# Patient Record
Sex: MALE | Race: WHITE | HISPANIC OR LATINO | URBAN - METROPOLITAN AREA
[De-identification: names, ages, dates, MRNs, and addresses within clinical notes are randomized per-mention and may not be internally consistent; named-entity substitution may affect disease eponyms.]

---

## 2024-05-21 ENCOUNTER — APPOINTMENT (OUTPATIENT)
Dept: GENERAL RADIOLOGY | Facility: HOSPITAL | Age: 24
End: 2024-05-21

## 2024-05-21 ENCOUNTER — HOSPITAL ENCOUNTER (EMERGENCY)
Facility: HOSPITAL | Age: 24
Discharge: HOME | End: 2024-05-21
Admitting: EMERGENCY MEDICINE

## 2024-05-21 VITALS
DIASTOLIC BLOOD PRESSURE: 79 MMHG | HEART RATE: 75 BPM | BODY MASS INDEX: 20.96 KG/M2 | HEIGHT: 69 IN | RESPIRATION RATE: 18 BRPM | WEIGHT: 141.54 LBS | TEMPERATURE: 98.7 F | SYSTOLIC BLOOD PRESSURE: 122 MMHG | OXYGEN SATURATION: 97 %

## 2024-05-21 DIAGNOSIS — R20.0 NUMBNESS AND TINGLING IN RIGHT HAND: ICD-10-CM

## 2024-05-21 DIAGNOSIS — S52.301A CLOSED FRACTURE OF SHAFT OF RIGHT RADIUS, UNSPECIFIED FRACTURE MORPHOLOGY, INITIAL ENCOUNTER: Primary | ICD-10-CM
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
## 2024-05-21 DIAGNOSIS — S53.401A SPRAIN OF RIGHT ELBOW, INITIAL ENCOUNTER: ICD-10-CM

## 2024-05-21 DIAGNOSIS — R20.2 NUMBNESS AND TINGLING IN RIGHT HAND: ICD-10-CM

## 2024-05-21 PROCEDURE — 73090 X-RAY EXAM OF FOREARM: CPT

## 2024-05-21 PROCEDURE — 99283 EMERGENCY DEPT VISIT LOW MDM: CPT

## 2024-05-21 PROCEDURE — 73070 X-RAY EXAM OF ELBOW: CPT

## 2024-05-21 RX ORDER — HYDROCODONE BITARTRATE AND ACETAMINOPHEN 5; 325 MG/1; MG/1
1 TABLET ORAL EVERY 6 HOURS PRN
Qty: 10 TABLET | Refills: 0 | Status: SHIPPED | OUTPATIENT
Start: 2024-05-21

## 2024-05-21 RX ORDER — HYDROCODONE BITARTRATE AND ACETAMINOPHEN 5; 325 MG/1; MG/1
1 TABLET ORAL ONCE
Status: COMPLETED | OUTPATIENT
Start: 2024-05-21 | End: 2024-05-21

## 2024-05-21 RX ADMIN — HYDROCODONE BITARTRATE AND ACETAMINOPHEN 1 TABLET: 5; 325 TABLET ORAL at 10:36

## 2024-05-21 NOTE — ED PROVIDER NOTES
Subjective   History of Present Illness  24-year-old male presents the ED with complaints of right arm pain after falling on concrete yesterday.  Patient denies LOC or hitting his head.  Patient has friend at bedside who is able to interpret for him and patient consented for friend to interpret. [id number]  [id number] was used for initial assessment and evaluation.  Patient's friend splinted patient's arm with Ortho-Glass and then wrapped with Ace wrap.  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  Patient complains of numbness without weakness in right forearm and hand.  Also complaining of swelling of the hand and discoloration.  Patient denies any daily medications        Review of Systems   Musculoskeletal:  Positive for arthralgias.       No past medical history on file.    No Known Allergies    No past surgical history on file.    No family history on file.    Social History     Socioeconomic History    Marital status: Unknown           Objective   Physical Exam  Vitals and nursing note reviewed.   Constitutional:       General: He is not in acute distress.     Appearance: Normal appearance. He is not ill-appearing.   HENT:      Head: Normocephalic and atraumatic.   Eyes:      Extraocular Movements: Extraocular movements intact.   Cardiovascular:      Rate and Rhythm: Normal rate and regular rhythm.      Pulses: Normal pulses.      Heart sounds: Normal heart sounds.   Pulmonary:      Effort: Pulmonary effort is normal.      Breath sounds: Normal breath sounds.   Abdominal:      General: Bowel sounds are normal.      Palpations: Abdomen is soft.   Musculoskeletal:         General: Swelling and tenderness present.      Cervical back: Normal range of motion.      Comments: The Ortho-Glass and Ace wrap was removed from patient's arm.  There is bruising to the lateral right forearm but no obvious deformity or injury.  Patient complains of  numbness to the right arm but is able to move fingers appropriately.  Patient does have pain with extension and flexion.  All compartments are soft.   Skin:     Findings: Bruising present.      Comments: No open wounds present.  Bruising to the lateral right forearm   Neurological:      Mental Status: He is alert and oriented to person, place, and time.   Psychiatric:         Mood and Affect: Mood normal.         Behavior: Behavior normal.         Splint - Cast - Strapping    Date/Time: 5/21/2024 12:04 PM    Performed by: Jo Olivo APRN  Authorized by: Jo Olivo APRN    Consent:     Consent obtained:  Verbal    Consent given by:  Patient    Risks, benefits, and alternatives were discussed: yes      Risks discussed:  Discoloration, numbness, pain and swelling    Alternatives discussed:  No treatment and delayed treatment  Universal protocol:     Procedure explained and questions answered to patient or proxy's satisfaction: yes      Imaging studies available: yes      Required blood products, implants, devices, and special equipment available: no      Site/side marked: yes      Immediately prior to procedure a time out was called: yes      Patient identity confirmed:  Verbally with patient and arm band  Pre-procedure details:     Distal neurologic exam:  Numbness    Distal perfusion: distal pulses strong and brisk capillary refill    Procedure details:     Location:  Arm    Arm location:  R lower arm    Cast type:  Long arm    Splint type:  Long arm    Supplies:  Fiberglass    Attestation: Splint applied and adjusted personally by me    Post-procedure details:     Distal neurologic exam:  Numbness and unchanged    Distal perfusion: distal pulses strong, brisk capillary refill and unchanged      Procedure completion:  Tolerated well, no immediate complications    Post-procedure imaging: not applicable               ED Course  ED Course as of 05/21/24 2232   Tue May 21, 2024   1109 Requested ortho consult,  "awaiting callback [KB]   1140 Spoke with Dr. Rey ortho, regarding patient presentation and x-ray of the forearm.  Stated for patient to follow-up in the office at 815 tomorrow morning to schedule probable surgery.  Also stated to apply a long-arm splint [KB]   1207 Inspect per vianca JUNIOR. Ninoska sent as her [employer] prescribing credentials pending.  [JW]      ED Course User Index  [JW] Trisha Retana APRN  [KB] Vianca Olivo APRN      /79 (BP Location: Left arm, Patient Position: Sitting)   Pulse 75   Temp 98.7 °F (37.1 °C) (Oral)   Resp 18   Ht 175.3 cm (69\")   Wt 64.2 kg (141 lb 8.6 oz)   SpO2 97%   BMI 20.90 kg/m²   Labs Reviewed - No data to display  Medications   HYDROcodone-acetaminophen (NORCO) 5-325 MG per tablet 1 tablet (1 tablet Oral Given 5/21/24 1036)     XR Forearm 2 View Right    Addendum Date: 5/21/2024    ADDENDUM #1 ADDENDUM: The first impression of the original report is incorrect and should state \"Mildly comminuted dorsally angulated fracture of the midshaft of the right RADIUS.\" Electronically Signed: Kelly Dixon MD  5/21/2024 11:07 AM EDT  Workstation ID: [id number] ORIGINAL REPORT: XR FOREARM 2 VW RIGHT, XR ELBOW 2 VW RIGHT Date of Exam: 5/21/2024 10:38 AM EDT Indication: fell last night and landed on R arm Comparison: None. Findings mildly comminuted fracture of the midshaft of the right radius with volar angulation at the fracture apex. Wrist joint appears appropriately aligned. The radial head may be partially subluxed in a posterior lateral direction, with some widening of the elbow joint space between the humeral condyle and radial head.. . The ulna appears intact. Impression: 1. Mildly comminuted dorsally angulated fracture of the midshaft of the right ulna. 2. Widening of the elbow joint space between the radial head and lateral humeral condyle, with suspected partial subluxation of the radial head. No yuly elbow dislocation. Electronically Signed: Kelly" "MD Zack  5/21/2024 10:46 AM EDT  Workstation ID: [id number]    Result Date: 5/21/2024  Impression: 1. Mildly comminuted dorsally angulated fracture of the midshaft of the right ulna. 2. Widening of the elbow joint space between the radial head and lateral humeral condyle, with suspected partial subluxation of the radial head. No yuly elbow dislocation. Electronically Signed: Kelly Dixon MD  5/21/2024 10:46 AM EDT  Workstation ID: [id number]    XR Elbow 2 View Right    Addendum Date: 5/21/2024    ADDENDUM #1 ADDENDUM: The first impression of the original report is incorrect and should state \"Mildly comminuted dorsally angulated fracture of the midshaft of the right RADIUS.\" Electronically Signed: Kelly Dixon MD  5/21/2024 11:07 AM EDT  Workstation ID: [id number] ORIGINAL REPORT: XR FOREARM 2 VW RIGHT, XR ELBOW 2 VW RIGHT Date of Exam: 5/21/2024 10:38 AM EDT Indication: fell last night and landed on R arm Comparison: None. Findings mildly comminuted fracture of the midshaft of the right radius with volar angulation at the fracture apex. Wrist joint appears appropriately aligned. The radial head may be partially subluxed in a posterior lateral direction, with some widening of the elbow joint space between the humeral condyle and radial head.. . The ulna appears intact. Impression: 1. Mildly comminuted dorsally angulated fracture of the midshaft of the right ulna. 2. Widening of the elbow joint space between the radial head and lateral humeral condyle, with suspected partial subluxation of the radial head. No yuly elbow dislocation. Electronically Signed: Kelly Dixon MD  5/21/2024 10:46 AM EDT  Workstation ID: [id number]    Result Date: 5/21/2024  Impression: 1. Mildly comminuted dorsally angulated fracture of the midshaft of the right ulna. 2. Widening of the elbow joint space between the radial head and lateral humeral condyle, with suspected partial subluxation of the radial head. No yuly elbow dislocation. " Electronically Signed: Kelly Dixon MD  5/21/2024 10:46 AM EDT  Workstation ID: [id number]                                          Medical Decision Making  Patient was seen for right arm pain from a fall last night.  Patient is a Kiswahili-speaking male has a friend at bedside.   initially used for first assessment, and patient verbalized consent for friend to interpret. Patient presented with a splint with Ortho-Glass to the right arm from his friend, friend stated he has this equipment due to being a  with soccer team just case anybody was injured.  This was removed due 2 complaints of numbness and tingling.  Hand was also slightly discolored.  Patient continued to complain numbness throughout ED course but discoloration did improve in the hand.  Patient was able to move all and without weakness, pulses distally intact.  X-ray was obtained to assess for dislocation or fractures.  Was independently interpreted by the radiologist and ER physician as having a mildly comminuted dorsally angulated fracture of the midshaft of the right radius along with a suspected partial subluxation of the right radial head. Dr. Rey with Ortho was consulted in regard to patient numbness and tingling and x-ray results.  And he stated for patient to follow-up with the office at 8:15 AM tomorrow morning for probable surgery and to place patient in a long-arm splint and sling.  Patient was placed in a long-arm splint to the right arm, please see procedure note for this.  After splinting patient did not have any new or worsening complaints, still has some numbness that was present prior to splint and sling.  Patient was given [place] in the ER for pain and upon reassessment states he feels slightly better.  Patient decided to use sling from home that fit well.  Care of arm and follow-up instructions given to patient and friend.  Prescription for Norco for pain was sent to patient's pharmacy.  Questions were  answered and patient verbalized understanding of disposition at this time are agreeable with plan.    I discussed findings with patient who voices understanding of discharge instructions, signs and symptoms requiring return to ED; discharged improved and in stable condition with follow up for re-evaluation.  This document is intended for medical expert use only. Reading of this document by patients and/or patient's family without participating medical staff guidance may result in misinterpretation and unintended morbidity.  Any interpretation of such data is the responsibility of the patient and/or family member responsible for the patient in concert with their primary or specialist providers, not to be left for sources of online searches such as [employer], [employer] or similar queries. Relying on these approaches to knowledge may result in misinterpretation, misguided goals of care and even death should patients or family members try recommendations outside of the realm of professional medical care in a supervised inpatient environment.       Problems Addressed:  Closed fracture of shaft of right radius, unspecified fracture morphology, initial encounter: complicated acute illness or injury  Fall, initial encounter: complicated acute illness or injury  Numbness and tingling in right hand: complicated acute illness or injury  Sprain of right elbow, initial encounter: complicated acute illness or injury    Amount and/or Complexity of Data Reviewed  Radiology: ordered.    Risk  Prescription drug management.        Final diagnoses:   Closed fracture of shaft of right radius, unspecified fracture morphology, initial encounter   Numbness and tingling in right hand   Sprain of right elbow, initial encounter   Fall, initial encounter       ED Disposition  ED Disposition       ED Disposition   Discharge    Condition   Stable    Comment   --               family connections  [phone]        Vanda Rey MD  [address] [facility]  [facility] [address]  [phone]    Go to   appt 5/22 @ 0815 AM         Medication List        New Prescriptions      HYDROcodone-acetaminophen 5-325 MG per tablet  Commonly known as: NORCO  [place] 1 tableta oral cada 6 horas eleuterio sea necesario para dolor moderado  (Take 1 tablet by mouth Every 6 (Six) Hours As Needed for Moderate Pain.)               Where to Get Your Medications        These medications were sent to [facility] [address] [place] [address]      Hours: Monday to Friday 7 AM to 7 PM Phone: [phone]   HYDROcodone-acetaminophen 5-325 MG per tablet            Jo Olivo, APRN  05/21/24 9319

## 2024-05-21 NOTE — DISCHARGE INSTRUCTIONS
Please follow-up with Ortho appointment tomorrow at 8:15 AM, please see paperwork for address    Continue to wear sling    May use Norco pills for pain as needed, follow pharmacy warning precautions    Return with any new or worsening symptoms

## 2024-05-29 ENCOUNTER — TELEPHONE (OUTPATIENT)
Dept: SPORTS MEDICINE | Facility: CLINIC | Age: 24
End: 2024-05-29

## 2024-05-29 NOTE — TELEPHONE ENCOUNTER
Caller: Checo Carreon    Relationship to patient: Self    Best call back number:  [phone] (home)       Chief complaint: RIGHT WRIST FX,IMAGING DONE AT [facility], SELF PAY, [facility] REFERRAL- PATIENT & HIS FRIEND, IVANNA CALLED TO LET THE OFFICE KNOW THEY WILL BE LATE. IVANNA STATED THEY ARE GOING TO TRY TO MAKE IT WITHIN THE 15 MINUTE WINDOW.     Type of visit: NEW PATIENT    Requested date: ASAP    If rescheduling, when is the original appointment: 6/4/24    Additional notes:PT WAS RUNNING LATE AND HAD TO RESCHEDULE HIS APPT TODAY 5/28/24. NEXT AVAILABLE WAS 6/4/24